# Patient Record
Sex: FEMALE | Race: WHITE | NOT HISPANIC OR LATINO | Employment: FULL TIME | URBAN - METROPOLITAN AREA
[De-identification: names, ages, dates, MRNs, and addresses within clinical notes are randomized per-mention and may not be internally consistent; named-entity substitution may affect disease eponyms.]

---

## 2024-04-28 ENCOUNTER — NURSE TRIAGE (OUTPATIENT)
Dept: OTHER | Facility: OTHER | Age: 29
End: 2024-04-28

## 2024-04-29 NOTE — TELEPHONE ENCOUNTER
"Reason for Disposition  • [1] SEVERE vomiting (e.g., 6 or more times/day) AND [2] present > 8 hours    Answer Assessment - Initial Assessment Questions  1. VOMITING SEVERITY: \"How many times have you vomited in the past 24 hours?\"      - MILD:  1 - 2 times/day     - MODERATE: 3 - 5 times/day, decreased oral intake without significant weight loss or symptoms of dehydration     - SEVERE: 6 or more times/day, vomits everything or nearly everything, with significant weight loss, symptoms of dehydration       Severe-throwing up yellow stuff at times    2. ONSET: \"When did the vomiting begin?\"       1000 today     3. FLUIDS: \"What fluids or food have you vomited up today?\" \"Are you able to keep any liquids down?\"      [place]an[place]    4. TREATMENT: \"What have you been doing so far to treat this?\"       B6 and unisom, one hour ago     5. DEHYDRATION: \"When was the last time you urinated?\" \"Are you feeling lightheaded?\" \"Weight loss?\"      Lightheaded and weak, last urinated 3 hours ago     6. PREGNANCY: \"How many weeks pregnant are you?\" \"How has the pregnancy been going?\"      Yes, 8 weeks     7. ASHUTOSH: \"What date are you expecting to deliver?\"      12/14/24    8. MEDICATIONS: \"What medications are you taking?\" (e.g., prenatal vitamins, iron)      B6 and unisom     9. OTHER SYMPTOMS: \"Do you have any other symptoms?\"      Denies    Protocols used: Pregnancy - Morning Sickness (Nausea and Vomiting of Pregnancy)-ADULT-AH    "

## 2024-04-29 NOTE — TELEPHONE ENCOUNTER
"Regardin weeks pregnant /vomiting  ----- Message from Judy Perez sent at 2024  8:39 PM EDT -----  \"I am 8 weeks pregnant and she can not keeping any thing down . I took B6 and Unisom together but still no relief I am not sure what to do\"    "

## 2024-04-29 NOTE — TELEPHONE ENCOUNTER
On call provider notified with patients symptoms. On call provider stated patient should be evaluated in the ED. Patient notified and verbalized understanding. Patient lives closest to [facility] and will go there for treatment. Patient has no further questions at this time.

## 2024-05-02 ENCOUNTER — ULTRASOUND (OUTPATIENT)
Dept: OBGYN CLINIC | Facility: CLINIC | Age: 29
End: 2024-05-02
Payer: COMMERCIAL

## 2024-05-02 ENCOUNTER — TELEPHONE (OUTPATIENT)
Facility: HOSPITAL | Age: 29
End: 2024-05-02

## 2024-05-02 VITALS
SYSTOLIC BLOOD PRESSURE: 124 MMHG | HEIGHT: 66 IN | WEIGHT: 143.4 LBS | BODY MASS INDEX: 23.05 KG/M2 | DIASTOLIC BLOOD PRESSURE: 62 MMHG

## 2024-05-02 DIAGNOSIS — O99.340 ANXIETY IN PREGNANCY, ANTEPARTUM: ICD-10-CM

## 2024-05-02 DIAGNOSIS — F41.9 ANXIETY IN PREGNANCY, ANTEPARTUM: ICD-10-CM

## 2024-05-02 DIAGNOSIS — Z3A.08 8 WEEKS GESTATION OF PREGNANCY: ICD-10-CM

## 2024-05-02 DIAGNOSIS — N91.2 AMENORRHEA: Primary | ICD-10-CM

## 2024-05-02 DIAGNOSIS — O21.9 NAUSEA AND VOMITING IN PREGNANCY: ICD-10-CM

## 2024-05-02 PROBLEM — G43.909 MIGRAINE: Status: ACTIVE | Noted: 2024-05-02

## 2024-05-02 PROCEDURE — 76817 TRANSVAGINAL US OBSTETRIC: CPT | Performed by: PHYSICIAN ASSISTANT

## 2024-05-02 PROCEDURE — 99213 OFFICE O/P EST LOW 20 MIN: CPT | Performed by: PHYSICIAN ASSISTANT

## 2024-05-02 RX ORDER — ONDANSETRON 4 MG/1
4 TABLET, ORALLY DISINTEGRATING ORAL EVERY 8 HOURS SCHEDULED
Qty: 60 TABLET | Refills: 1 | Status: SHIPPED | OUTPATIENT
Start: 2024-05-02

## 2024-05-02 RX ORDER — ONDANSETRON 4 MG/1
TABLET, ORALLY DISINTEGRATING ORAL
COMMUNITY
Start: 2024-04-29 | End: 2024-05-02 | Stop reason: SDUPTHER

## 2024-05-02 NOTE — ASSESSMENT & PLAN NOTE
- We discussed that there are significant  risks associated with untreated and undertreated depression and other  mental illnesses in pregnancy.    - The [facility] is an additional resource for screening and treatment of depression.  781-880-ESBM (9)  - Resources for a mental health crisis include the emergency department and the National Suicide Prevention Hotline at 5-527-115-WIFQ.   - We discussed that Sertraline  (Zoloft) is associated with the lowest risk of PPHN.  PPHN is a rare condition resulting in failure of normal relaxation of fetal vascular bed during the transition period after birth resulting in increased resistance in the pulmonary blood vessels; it  occurs in 2-6 cases per 1000 live births. Probability of PPHN as follows,  in order of increasing risk of PPHN: sertraline (Zoloft), escitalopram (Lexapro), Paroxetine (Paxil; discouraged), Citalopram (Celexa), followed by Fluoxetine (Prozac).   The risk of PPHN also exists in the general population not on SSRIs.   - The use of selective serotonin reuptake inhibitors (SSRIs) in pregnancy is also associated with a risk of transient  withdrawal, which is typically mild, self-limited, and does not usually require  intensive care.    - We discussed that SSRIs are compatible with breastfeeding (ACOG PB #92)  - If desired, neonatology consultation is available in the third trimester to further discuss these  complications.

## 2024-05-02 NOTE — TELEPHONE ENCOUNTER
Called patient to schedule MFM appointment, based on referral issued to Maternal Fetal Medicine by OB office.    Left voicemail requesting patient to call back and schedule appointment, with office number for return call [phone].

## 2024-05-02 NOTE — ASSESSMENT & PLAN NOTE
Reviewed nausea and vomiting. Improved with Zofran.   Reviewed Zofran with patient in length. Also reviewed option of Reglan. Patient would like to continue Zofran as needed. Script renewed to pharmacy.   Reviewed recommendation to continue Vitamin B6 and Unisom, small frequent meals, B6, ginger.

## 2024-05-02 NOTE — PROGRESS NOTES
Pregnancy Confirmation Visit  [facility] - [place] [address], [address], [place] [address]    Assessment/Plan:  28 y.o.  presenting with missed menses.  Viable pregnancy 8w0d by LMP consistent with ultrasound today.  - Continue/start prenatal vitamin  - We reviewed her current medications and discussed which are safe to continue in pregnancy  - We briefly discussed options for aneuploidy screening, to be discussed further at the prenatal intake, referral for MFM given.   - Schedule prenatal intake with RN and initial prenatal visit; prenatal labs will be ordered during the prenatal intake    Additional Pregnancy Problems Addressed today:   1. Amenorrhea  -     AMB US OB < 14 weeks single or first gestation level 1    2. Nausea and vomiting in pregnancy  Assessment & Plan:  Reviewed nausea and vomiting. Improved with Zofran.   Reviewed Zofran with patient in length. Also reviewed option of Reglan. Patient would like to continue Zofran as needed. Script renewed to pharmacy.   Reviewed recommendation to continue Vitamin B6 and Unisom, small frequent meals, B6, ginger.     Orders:  -     ondansetron (ZOFRAN-ODT) 4 mg disintegrating tablet; Take 1 tablet (4 mg total) by mouth every 8 (eight) hours    3. 8 weeks gestation of pregnancy  -     Ambulatory Referral to Maternal Fetal Medicine; Future; Expected date: 2024    4. Anxiety in pregnancy, antepartum  Assessment & Plan:  - We discussed that there are significant  risks associated with untreated and undertreated depression and other  mental illnesses in pregnancy.    - The [facility] is an additional resource for screening and treatment of depression.  054-035-MNJE ([phone])  - Resources for a mental health crisis include the emergency department and the National Suicide Prevention Hotline at 9-948-866-TALK.   - We discussed that Sertraline  (Zoloft) is associated with the lowest risk of PPHN.  PPHN is a rare condition  "resulting in failure of normal relaxation of fetal vascular bed during the transition period after birth resulting in increased resistance in the pulmonary blood vessels; it  occurs in 2-6 cases per 1000 live births. Probability of PPHN as follows,  in order of increasing risk of PPHN: sertraline (Zoloft), escitalopram (Lexapro), Paroxetine (Paxil; discouraged), Citalopram (Celexa), followed by Fluoxetine (Prozac).   The risk of PPHN also exists in the general population not on SSRIs.   - The use of selective serotonin reuptake inhibitors (SSRIs) in pregnancy is also associated with a risk of transient  withdrawal, which is typically mild, self-limited, and does not usually require  intensive care.    - We discussed that SSRIs are compatible with breastfeeding (ACOG PB #92)  - If desired, neonatology consultation is available in the third trimester to further discuss these  complications.              Subjective:    CC: Missed period    Vianey Colunga is a 28 y.o.  who presents with missed menses.  Patient's last menstrual period was 2024 (exact date)..    Patient notes that this pregnancy was planned and desired.  She was not using contraception at the time of conception. She reports she is certain of her LMP and that she has regular menses, approximately q28 days. She has has no vaginal bleeding since her LMP.    Objective:  /62 (BP Location: Right arm, Patient Position: Sitting, Cuff Size: Standard)   Ht 5' 5.5\" (1.664 m)   Wt 65 kg (143 lb 6.4 oz)   LMP 2024 (Exact Date)   Breastfeeding No   BMI 23.50 kg/m²     Physical Exam:  General: Well appearing, no distress  CV: Regular rate  Respiratory: Unlabored breathing  Abdomen: Soft, nontender  Extremities: Without edema  Mood and Affect: Appropriate    FIRST TRIMESTER OBSTETRIC ULTRASOUND  Date of study: 2024  Performed by: Mayra Rodriguez PA-C     INDICATION: Amenorrhea, viability    COMPARISON: None.   "   TECHNIQUE:   Transvaginal imaging was performed to assess the gestation, myometrial/endometrial architecture and ovarian parenchymal detail.    The study includes volumetric sweeps and traditional still imaging technique.      FINDINGS:     A single intrauterine gestation is identified.  Cardiac activity is detected at 158.      YOLK SAC:  Present and normal in size and appearance.  MEAN CROWN RUMP LENGTH:  15.2 mm = 7 weeks 6 days   AMNIOTIC FLUID/SAC SHAPE:  Within expected normal range.     UTERUS/ADNEXA:   No adnexal mass or pathologic cyst.  No free fluid identified.     IMPRESSION:    Will assign dating based on LMP (3/7/2024)  Final ASHUTOSH: 12/12/2024  Gestational age: 8w0d  Fetal cardiac activity detected.  No adnexal masses seen.    Mayra Rodriguez PA-C  5/2/2024 2:27 PM       Additional Findings: none     FHR: 158    Assigning a Final ASHUTOSH  Please choose how you are assigning the ASHUTOSH: The gestational age by LMP is </= 8w 6d and demonstrates 5 or fewer days difference from the gestational age by CRL, therefore the final ASHUTOSH will be based on the LMP    Final ASHUTOSH: 12/12/024 by LMP.        Mayra Rodriguez PA-C  [facility] [address] [address]  Dept: [phone]  Loc Appt: [phone]  Loc: [phone]

## 2024-05-02 NOTE — PATIENT INSTRUCTIONS
Magnesium oxide 250-400mg daily  Riboflavin 400mg daily      - We discussed that there are significant  risks associated with untreated and undertreated depression and other  mental illnesses in pregnancy.    - The [facility] is an additional resource for screening and treatment of depression.  260-321-KTQB ()  - Resources for a mental health crisis include the emergency department and the National Suicide Prevention Hotline at 4-520-417-TLIJ.   - We discussed that Sertraline  (Zoloft) is associated with the lowest risk of PPHN.  PPHN is a rare condition resulting in failure of normal relaxation of fetal vascular bed during the transition period after birth resulting in increased resistance in the pulmonary blood vessels; it  occurs in 2-6 cases per 1000 live births. Probability of PPHN as follows,  in order of increasing risk of PPHN: sertraline (Zoloft), escitalopram (Lexapro), Paroxetine (Paxil; discouraged), Citalopram (Celexa), followed by Fluoxetine (Prozac).   The risk of PPHN also exists in the general population not on SSRIs.   - The use of selective serotonin reuptake inhibitors (SSRIs) in pregnancy is also associated with a risk of transient  withdrawal, which is typically mild, self-limited, and does not usually require  intensive care.    - We discussed that SSRIs are compatible with breastfeeding (ACOG PB #92)  - If desired, neonatology consultation is available in the third trimester to further discuss these  complications.

## 2024-05-07 ENCOUNTER — TELEPHONE (OUTPATIENT)
Age: 29
End: 2024-05-07

## 2024-05-07 NOTE — TELEPHONE ENCOUNTER
Patient was calling in regards to her medication. Advised her that the medication was sent to the pharmacy back on 5/2. She stated she will check with the pharmacy again.